# Patient Record
Sex: FEMALE | Race: BLACK OR AFRICAN AMERICAN | NOT HISPANIC OR LATINO | URBAN - METROPOLITAN AREA
[De-identification: names, ages, dates, MRNs, and addresses within clinical notes are randomized per-mention and may not be internally consistent; named-entity substitution may affect disease eponyms.]

---

## 2018-06-11 ENCOUNTER — EMERGENCY (EMERGENCY)
Facility: HOSPITAL | Age: 48
LOS: 1 days | Discharge: HOME | End: 2018-06-11
Attending: EMERGENCY MEDICINE | Admitting: EMERGENCY MEDICINE
Payer: COMMERCIAL

## 2018-06-11 VITALS
HEART RATE: 64 BPM | SYSTOLIC BLOOD PRESSURE: 175 MMHG | OXYGEN SATURATION: 100 % | TEMPERATURE: 98 F | DIASTOLIC BLOOD PRESSURE: 100 MMHG | RESPIRATION RATE: 16 BRPM

## 2018-06-11 VITALS
HEART RATE: 83 BPM | TEMPERATURE: 98 F | OXYGEN SATURATION: 100 % | DIASTOLIC BLOOD PRESSURE: 87 MMHG | RESPIRATION RATE: 16 BRPM | SYSTOLIC BLOOD PRESSURE: 147 MMHG

## 2018-06-11 LAB
ALBUMIN SERPL ELPH-MCNC: 4.6 G/DL — SIGNIFICANT CHANGE UP (ref 3.3–5)
ALP SERPL-CCNC: 80 U/L — SIGNIFICANT CHANGE UP (ref 40–120)
ALT FLD-CCNC: 21 U/L — SIGNIFICANT CHANGE UP (ref 4–33)
AST SERPL-CCNC: 29 U/L — SIGNIFICANT CHANGE UP (ref 4–32)
BASOPHILS # BLD AUTO: 0.08 K/UL — SIGNIFICANT CHANGE UP (ref 0–0.2)
BASOPHILS NFR BLD AUTO: 1.1 % — SIGNIFICANT CHANGE UP (ref 0–2)
BILIRUB SERPL-MCNC: 0.2 MG/DL — SIGNIFICANT CHANGE UP (ref 0.2–1.2)
BUN SERPL-MCNC: 14 MG/DL — SIGNIFICANT CHANGE UP (ref 7–23)
CALCIUM SERPL-MCNC: 9 MG/DL — SIGNIFICANT CHANGE UP (ref 8.4–10.5)
CHLORIDE SERPL-SCNC: 101 MMOL/L — SIGNIFICANT CHANGE UP (ref 98–107)
CO2 SERPL-SCNC: 21 MMOL/L — LOW (ref 22–31)
CREAT SERPL-MCNC: 1.02 MG/DL — SIGNIFICANT CHANGE UP (ref 0.5–1.3)
EOSINOPHIL # BLD AUTO: 0.13 K/UL — SIGNIFICANT CHANGE UP (ref 0–0.5)
EOSINOPHIL NFR BLD AUTO: 1.8 % — SIGNIFICANT CHANGE UP (ref 0–6)
GLUCOSE SERPL-MCNC: 102 MG/DL — HIGH (ref 70–99)
HCT VFR BLD CALC: 39.1 % — SIGNIFICANT CHANGE UP (ref 34.5–45)
HGB BLD-MCNC: 12.7 G/DL — SIGNIFICANT CHANGE UP (ref 11.5–15.5)
IMM GRANULOCYTES # BLD AUTO: 0.01 # — SIGNIFICANT CHANGE UP
IMM GRANULOCYTES NFR BLD AUTO: 0.1 % — SIGNIFICANT CHANGE UP (ref 0–1.5)
LYMPHOCYTES # BLD AUTO: 3.36 K/UL — HIGH (ref 1–3.3)
LYMPHOCYTES # BLD AUTO: 46.4 % — HIGH (ref 13–44)
MCHC RBC-ENTMCNC: 28.9 PG — SIGNIFICANT CHANGE UP (ref 27–34)
MCHC RBC-ENTMCNC: 32.5 % — SIGNIFICANT CHANGE UP (ref 32–36)
MCV RBC AUTO: 89.1 FL — SIGNIFICANT CHANGE UP (ref 80–100)
MONOCYTES # BLD AUTO: 0.53 K/UL — SIGNIFICANT CHANGE UP (ref 0–0.9)
MONOCYTES NFR BLD AUTO: 7.3 % — SIGNIFICANT CHANGE UP (ref 2–14)
NEUTROPHILS # BLD AUTO: 3.13 K/UL — SIGNIFICANT CHANGE UP (ref 1.8–7.4)
NEUTROPHILS NFR BLD AUTO: 43.3 % — SIGNIFICANT CHANGE UP (ref 43–77)
NRBC # FLD: 0 — SIGNIFICANT CHANGE UP
PLATELET # BLD AUTO: 267 K/UL — SIGNIFICANT CHANGE UP (ref 150–400)
PMV BLD: 12.3 FL — SIGNIFICANT CHANGE UP (ref 7–13)
POTASSIUM SERPL-MCNC: 4.6 MMOL/L — SIGNIFICANT CHANGE UP (ref 3.5–5.3)
POTASSIUM SERPL-SCNC: 4.6 MMOL/L — SIGNIFICANT CHANGE UP (ref 3.5–5.3)
PROT SERPL-MCNC: 7.6 G/DL — SIGNIFICANT CHANGE UP (ref 6–8.3)
RBC # BLD: 4.39 M/UL — SIGNIFICANT CHANGE UP (ref 3.8–5.2)
RBC # FLD: 12.7 % — SIGNIFICANT CHANGE UP (ref 10.3–14.5)
SODIUM SERPL-SCNC: 137 MMOL/L — SIGNIFICANT CHANGE UP (ref 135–145)
WBC # BLD: 7.24 K/UL — SIGNIFICANT CHANGE UP (ref 3.8–10.5)
WBC # FLD AUTO: 7.24 K/UL — SIGNIFICANT CHANGE UP (ref 3.8–10.5)

## 2018-06-11 PROCEDURE — 99284 EMERGENCY DEPT VISIT MOD MDM: CPT

## 2018-06-11 RX ORDER — SODIUM CHLORIDE 9 MG/ML
1000 INJECTION INTRAMUSCULAR; INTRAVENOUS; SUBCUTANEOUS ONCE
Qty: 0 | Refills: 0 | Status: COMPLETED | OUTPATIENT
Start: 2018-06-11 | End: 2018-06-11

## 2018-06-11 RX ORDER — METOCLOPRAMIDE HCL 10 MG
10 TABLET ORAL ONCE
Qty: 0 | Refills: 0 | Status: COMPLETED | OUTPATIENT
Start: 2018-06-11 | End: 2018-06-11

## 2018-06-11 RX ORDER — ACETAMINOPHEN 500 MG
1000 TABLET ORAL ONCE
Qty: 0 | Refills: 0 | Status: COMPLETED | OUTPATIENT
Start: 2018-06-11 | End: 2018-06-11

## 2018-06-11 RX ADMIN — SODIUM CHLORIDE 1000 MILLILITER(S): 9 INJECTION INTRAMUSCULAR; INTRAVENOUS; SUBCUTANEOUS at 22:44

## 2018-06-11 RX ADMIN — Medication 400 MILLIGRAM(S): at 23:08

## 2018-06-11 RX ADMIN — Medication 10 MILLIGRAM(S): at 23:08

## 2018-06-11 NOTE — ED ADULT TRIAGE NOTE - CHIEF COMPLAINT QUOTE
Pt comes in for c/o sudden onset of HTN with right side HA that started since Wednesday. Pt reports that she went to urgent care today and was sent here for elevated BP of 197/116. Pt appears in rosemary cute distress, vs as noted, EKG to be completed.

## 2018-06-11 NOTE — ED PROVIDER NOTE - CHPI ED SYMPTOMS POS
right eye pain, right neck pain after shoulder surgery and cleaning attic, right sided headache and hypertension

## 2018-06-11 NOTE — ED PROVIDER NOTE - OBJECTIVE STATEMENT
48F with hx of HLD p/w persistent HA and elevated BP. Patient states HA started a week ago after developing pain in her right eye. pain initially was on the right side, behind her eye, but has since radiated to her entire head. today took ibuprofen for relief at about 8pm, only mild improvement. went to urgent care and noted BP to be very high, referred to ED for eval. No hx of elevated BP in the past. denies any cp, sob, neck pain, weakness. no aggravated by light or noise. no hx of migraines or HAs in the past. (+) intermittent visual changes.

## 2018-06-11 NOTE — ED ADULT NURSE NOTE - PATIENT DISCHARGE SIGNATURE
Bi-Rhombic Flap Text: The defect edges were debeveled with a #15 scalpel blade.  Given the location of the defect and the proximity to free margins a bi-rhombic flap was deemed most appropriate.  Using a sterile surgical marker, an appropriate rhombic flap was drawn incorporating the defect. The area thus outlined was incised deep to adipose tissue with a #15 scalpel blade.  The skin margins were undermined to an appropriate distance in all directions utilizing iris scissors. 12-Jun-2018

## 2018-06-11 NOTE — ED ADULT NURSE NOTE - OBJECTIVE STATEMENT
pt is a&ox3. respirations even and unlabored, in NAD. Pt states R eye pain/ frontal headache radiating to back of head since Wednesday. Also attests to increased stressors. Pt seen by urgent care, sent to ED for elevated BP. pt denies acute vision changes. 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 Pt was evaluated by ED attending. IV placed. Labs drawn and sent to lab. Pt pending UCG at this time, urine cup provided.

## 2018-06-11 NOTE — ED PROVIDER NOTE - PROGRESS NOTE DETAILS
Sign out follow-up: BP improved. Pt feels subjectively better. Labs non-actionable. Supportive care outpt. OMAR.

## 2018-06-11 NOTE — ED PROVIDER NOTE - CARE PLAN
Principal Discharge DX:	Acute non intractable tension-type headache  Secondary Diagnosis:	Hordeolum externum of right upper eyelid  Secondary Diagnosis:	Hypertension, unspecified type

## 2018-06-12 RX ADMIN — Medication 1000 MILLIGRAM(S): at 00:21

## 2021-04-16 NOTE — ED ADULT TRIAGE NOTE - ESI TRIAGE ACUITY LEVEL, MLM
04/16/21                            Freya Roche  [address]  [id number] [facility] [id number]    To Whom It May Concern:    This is to certify Freya Kurtzchrisnilsa was evaluated with SANDRA Hernandez on 04/16/21 and can return to work on 4/17/2021.               SANDRA Hernandez  [facility], [address] [address]  Phone: [phone]  Fax: [phone]    
3

## 2022-10-17 PROBLEM — E78.5 HYPERLIPIDEMIA, UNSPECIFIED: Chronic | Status: ACTIVE | Noted: 2018-06-11

## 2022-10-23 ENCOUNTER — APPOINTMENT (OUTPATIENT)
Dept: ORTHOPEDIC SURGERY | Facility: CLINIC | Age: 52
End: 2022-10-23

## 2022-10-23 VITALS — WEIGHT: 150 LBS | BODY MASS INDEX: 27.6 KG/M2 | HEIGHT: 62 IN

## 2022-10-23 PROCEDURE — 73564 X-RAY EXAM KNEE 4 OR MORE: CPT | Mod: 50

## 2022-10-23 PROCEDURE — 99203 OFFICE O/P NEW LOW 30 MIN: CPT

## 2022-10-23 RX ORDER — NAPROXEN 500 MG/1
500 TABLET ORAL
Qty: 28 | Refills: 1 | Status: COMPLETED | COMMUNITY
Start: 2022-10-23

## 2022-10-23 NOTE — DISCUSSION/SUMMARY
[de-identified] : This is a 52 year old female with bilateral knee chondromalacia and left knee patellar tendonitis. Diagnosis was discussed and treatment options were reviewed. We discussed treatment options in length including oral anti-inflammatories, physical therapy, activity modification, bracing, cortisone injections and viscosupplementation injections. \par \par At this time we will start with conservative therapies. PT was prescribed as well as naproxen. She was advised to use a chopart strap on the left. \par \par The patient was prescribed an anti- inflammatory medication at today's visit. The patient was advised that this medication should be taken with food as they can cause gastrointestinal upset. They patient is also aware that these medications may exacerbate any pre existing hypertension and they should speak with their medical doctor before starting the medication. They were advised to stop the medication if they experience any stomach upset or develop headaches or blurry vision.\par \par She will follow up in 1 month if pain persists, consider MRI. All of her questions were answered. SHe understands and agrees.

## 2022-10-23 NOTE — ASSESSMENT
[FreeTextEntry1] : 4 x-ray Views of the knee were ordered and interpreted by myself. The results were reviewed with the patient and demonstrate normal bony alignment without any fractures or dislocations. There is mild medial joint space narrowing. No lytic or blastic lesions. \par \par

## 2022-10-23 NOTE — HISTORY OF PRESENT ILLNESS
[Gradual] : gradual [7] : 7 [2] : 2 [Sharp] : sharp [Intermittent] : intermittent [Rest] : rest [Walking] : walking [Stairs] : stairs [Full time] : Work status: full time [de-identified] : This is a 53 year old female with "a couple of months" of bilateral knee pain, left worse than right. Pain is mostly anterior and she feels "crunching" in the knees. The left knee did buckle on her a couple of times. She denies swelling or locking. She denies any prior injury or treatments to the knees. She has not tried any OTC medications. \par \par PMH: denies\par Allergies: NKDA [] : no [FreeTextEntry1] : both knee pain  [FreeTextEntry5] : patient presents with bilateral knee pain since 2 weeks ago she went to [facility] urgent care they referred her here to see Dr. yu and get MRI patient would like to see provider before xray if needed  [FreeTextEntry9] : elevated

## 2022-10-23 NOTE — PHYSICAL EXAM
[de-identified] : Patient is well-appearing in no acute distress awake alert and oriented ×3. \par \par Examination of the left knee shows range of motion from 0-135° of flexion. Stable to varus and valgus stress at 0 and 30°. No medial or lateral joint line tenderness. Negative Lachman, negative anterior drawer, negative posterior drawer. No medial or lateral patellar facet tenderness. POSITIVE patellar tendon tenderness. No effusion. No swelling. Skin is intact without rashes or erythema. No warmth about the knee. Negative Clemente's medially and laterally. POSITIVE crepitus is noted. PAIN with patellar compression. \par \par Examination of the right knee shows range of motion from 0-135° of flexion. Stable to varus and valgus stress at 0 and 30°. No medial or lateral joint line tenderness. Negative Lachman, negative anterior drawer, negative posterior drawer. No medial or lateral patellar facet tenderness. No patellar tendon tenderness. No effusion. No swelling. Skin is intact without rashes or erythema. No warmth about the knee. Negative Clemente's medially and laterally. POSITIVE crepitus is noted. \par \par Distally patient is neurovascularly intact with 5 out of 5 strength in dorsiflexion, plantarflexion, great toe extension. Sensation is grossly intact to light touch in the L4-S1 distribution. Palpable dorsalis pedis and posterior tibialis pulses. Toes are warm and well perfused with brisk capillary refill.\par \par

## 2023-03-31 ENCOUNTER — APPOINTMENT (OUTPATIENT)
Dept: ORTHOPEDIC SURGERY | Facility: CLINIC | Age: 53
End: 2023-03-31
Payer: COMMERCIAL

## 2023-03-31 VITALS — HEIGHT: 62 IN | BODY MASS INDEX: 27.6 KG/M2 | WEIGHT: 150 LBS

## 2023-03-31 DIAGNOSIS — Z00.00 ENCOUNTER FOR GENERAL ADULT MEDICAL EXAMINATION W/OUT ABNORMAL FINDINGS: ICD-10-CM

## 2023-03-31 DIAGNOSIS — E78.00 PURE HYPERCHOLESTEROLEMIA, UNSPECIFIED: ICD-10-CM

## 2023-03-31 PROCEDURE — 99213 OFFICE O/P EST LOW 20 MIN: CPT

## 2023-03-31 PROCEDURE — 73030 X-RAY EXAM OF SHOULDER: CPT | Mod: LT

## 2023-03-31 RX ORDER — ATORVASTATIN CALCIUM 80 MG/1
TABLET, FILM COATED ORAL
Refills: 0 | Status: ACTIVE | COMMUNITY

## 2023-03-31 RX ORDER — IBUPROFEN 600 MG/1
600 TABLET, FILM COATED ORAL TWICE DAILY
Qty: 60 | Refills: 2 | Status: ACTIVE | COMMUNITY
Start: 2023-03-31 | End: 1900-01-01

## 2023-03-31 RX ORDER — NAPROXEN 500 MG/1
500 TABLET ORAL
Qty: 28 | Refills: 1 | Status: COMPLETED | COMMUNITY
Start: 2022-10-25 | End: 2023-03-31

## 2023-03-31 NOTE — HISTORY OF PRESENT ILLNESS
[10] : 10 [0] : 0 [Radiating] : radiating [Sharp] : sharp [Shooting] : shooting [Nothing helps with pain getting better] : Nothing helps with pain getting better [de-identified] : 3/31/23: 52 y/o RHD female c/o left shoulder pain for the past month. No specific injury. Describes pain with ROM. Denies radiating pain. No history of prior injury. Also, notes persistent pain in the left knee. She went to [facility], where PT was prescribed with good relief. [FreeTextEntry1] : lt shoulder lt knee [FreeTextEntry5] : Pt is a 53 year old F who presents pain for lt shoulder. Pain started February 2023, pain increase. Pain worsen when moving lt shoulder. Neck is stiff. Sleep is affected. Pain radiates from above the elbow up the lt shoulder. No previous injury. \par \par Started PT for lt knee, states she is improving and pain decreasing.

## 2023-03-31 NOTE — PHYSICAL EXAM
[5 ___] : forward flexion 5[unfilled]/5 [5___] : hamstring 5[unfilled]/5 [4___] : abduction 4[unfilled]/5 [Left] : left shoulder [There are no fractures, subluxations or dislocations. No significant abnormalities are seen] : There are no fractures, subluxations or dislocations. No significant abnormalities are seen [TWNoteComboBox7] : active forward flexion 170 degrees [TWNoteComboBox6] : internal rotation lateral hip [de-identified] : external rotation 30 degrees [] : no erythema

## 2023-03-31 NOTE — ASSESSMENT
[FreeTextEntry1] : subacute onset left shoulder pain.  likely bursitis versus ptrct.  \par \par history of right shoulder surgery by me.  doing well.  soft tissue tenodesis, bursectomy, sad in 2018\par \par left knee pain with chondromalacia improved with Pt.\par \par in design, works a lot with mannequins.

## 2023-06-09 ENCOUNTER — APPOINTMENT (OUTPATIENT)
Dept: ORTHOPEDIC SURGERY | Facility: CLINIC | Age: 53
End: 2023-06-09
Payer: COMMERCIAL

## 2023-06-09 VITALS — BODY MASS INDEX: 27.6 KG/M2 | HEIGHT: 62 IN | WEIGHT: 150 LBS

## 2023-06-09 PROCEDURE — 99214 OFFICE O/P EST MOD 30 MIN: CPT

## 2023-06-09 PROCEDURE — 73610 X-RAY EXAM OF ANKLE: CPT | Mod: LT

## 2023-06-09 NOTE — DISCUSSION/SUMMARY
[de-identified] : Progress Note completed by Kristie Rowell PA-C\par * Dr. Maciel -- The documentation recorded in this note accurately reflects the decisions made by me during this visit.

## 2023-06-09 NOTE — HISTORY OF PRESENT ILLNESS
[0] : 0 [Localized] : localized [de-identified] : 3/31/23: 52 y/o RHD female c/o left shoulder pain for the past month. No specific injury. Describes pain with ROM. Denies radiating pain. No history of prior injury. Also, notes persistent pain in the left knee. She went to [facility], where PT was prescribed with good relief.\par 6/9/23:  left knee and left shoulder improved with pt.  no pain.  now left ankle pain since end of may 2023.  no injury.  possible exaerbation due to walking in the city. [FreeTextEntry1] : left knee, left shoulder  [de-identified] : none

## 2023-06-09 NOTE — ASSESSMENT
[FreeTextEntry1] : subacute onset left shoulder pain.  likely bursitis versus ptrct.   improved with pt. \par \par history of right shoulder surgery by me.  doing well.  soft tissue tenodesis, bursectomy, sad in 2018.  minimal pain.\par \par left knee pain with chondromalacia improved with Pt.\par \par now left ankle pain since end of may 2023.  no injury.  possible exacerbation due to walking in the city.  medial pain along deltoid and post tib.  possible tendonitis versus sprain\par \par in design, works a lot with mannequins.

## 2023-06-09 NOTE — PHYSICAL EXAM
[5 ___] : forward flexion 5[unfilled]/5 [4___] : abduction 4[unfilled]/5 [NL (40)] : plantar flexion 40 degrees [NL 30)] : inversion 30 degrees [NL (20)] : eversion 20 degrees [5___] : eversion 5[unfilled]/5 [2+] : posterior tibialis pulse: 2+ [TWNoteComboBox7] : active forward flexion 170 degrees [TWNoteComboBox6] : internal rotation lateral hip [de-identified] : external rotation 30 degrees [] : no erythema [Left] : left ankle [There are no fractures, subluxations or dislocations. No significant abnormalities are seen] : There are no fractures, subluxations or dislocations. No significant abnormalities are seen

## 2023-08-11 ENCOUNTER — APPOINTMENT (OUTPATIENT)
Dept: ORTHOPEDIC SURGERY | Facility: CLINIC | Age: 53
End: 2023-08-11
Payer: COMMERCIAL

## 2023-08-11 DIAGNOSIS — M22.41 CHONDROMALACIA PATELLAE, RIGHT KNEE: ICD-10-CM

## 2023-08-11 DIAGNOSIS — M23.92 UNSPECIFIED INTERNAL DERANGEMENT OF LEFT KNEE: ICD-10-CM

## 2023-08-11 DIAGNOSIS — M22.42 CHONDROMALACIA PATELLAE, LEFT KNEE: ICD-10-CM

## 2023-08-11 DIAGNOSIS — M75.52 BURSITIS OF LEFT SHOULDER: ICD-10-CM

## 2023-08-11 PROCEDURE — 99213 OFFICE O/P EST LOW 20 MIN: CPT

## 2023-08-11 NOTE — ASSESSMENT
[FreeTextEntry1] : subacute onset left shoulder pain.  likely bursitis versus ptrct.   improved with pt.   history of right shoulder surgery by me.  doing well.  soft tissue tenodesis, bursectomy, sad in 2018.  minimal pain.  left knee pain with chondromalacia.  continued pain.  medial and anterior.  possible mmt, possible pf chondral damage.   now left ankle pain since end of may 2023.  no injury.  possible exacerbation due to walking in the city.  medial pain along deltoid and post tib.  possible tendonitis versus sprain versus PTT tear  in design, works a lot with mannequins.

## 2023-08-11 NOTE — REASON FOR VISIT
[FreeTextEntry2] : Follow Up: Lt Knee still in pain. ankle is swollen. would like to get an MRI done.

## 2023-08-11 NOTE — PHYSICAL EXAM
[5 ___] : forward flexion 5[unfilled]/5 [4___] : abduction 4[unfilled]/5 [NL (40)] : plantar flexion 40 degrees [NL 30)] : inversion 30 degrees [NL (20)] : eversion 20 degrees [5___] : eversion 5[unfilled]/5 [2+] : posterior tibialis pulse: 2+ [Left] : left ankle [There are no fractures, subluxations or dislocations. No significant abnormalities are seen] : There are no fractures, subluxations or dislocations. No significant abnormalities are seen [TWNoteComboBox7] : active forward flexion 170 degrees [TWNoteComboBox6] : internal rotation lateral hip [de-identified] : external rotation 30 degrees [] : no erythema

## 2023-08-11 NOTE — HISTORY OF PRESENT ILLNESS
[6] : 6 [de-identified] : 3/31/23: 54 y/o RHD female c/o left shoulder pain for the past month. No specific injury. Describes pain with ROM. Denies radiating pain. No history of prior injury. Also, notes persistent pain in the left knee. She went to [facility], where PT was prescribed with good relief. 6/9/23:  left knee and left shoulder improved with pt.  no pain.  now left ankle pain since end of may 2023.  no injury.  possible exaerbation due to walking in the city. 8/11/23:  left knee and left ankle pain continues.

## 2023-08-11 NOTE — DISCUSSION/SUMMARY
[de-identified] : mri left ankle to assess.  mri left knee to assess.  pt. follow up with resdults.   Progress note completed by Kristie Rowell PA-C *Dr. Maciel - The WENDY assigned on this date is under my supervision and saw this patient independently.  I have reviewed the note/plan and agree with the treatment provided.

## 2023-08-19 ENCOUNTER — APPOINTMENT (OUTPATIENT)
Dept: MRI IMAGING | Facility: CLINIC | Age: 53
End: 2023-08-19
Payer: COMMERCIAL

## 2023-08-19 PROCEDURE — 73721 MRI JNT OF LWR EXTRE W/O DYE: CPT | Mod: NC

## 2023-08-25 ENCOUNTER — APPOINTMENT (OUTPATIENT)
Dept: ORTHOPEDIC SURGERY | Facility: CLINIC | Age: 53
End: 2023-08-25
Payer: COMMERCIAL

## 2023-08-25 VITALS — BODY MASS INDEX: 27.6 KG/M2 | WEIGHT: 150 LBS | HEIGHT: 62 IN

## 2023-08-25 DIAGNOSIS — M76.52 PATELLAR TENDINITIS, LEFT KNEE: ICD-10-CM

## 2023-08-25 DIAGNOSIS — M77.52 OTHER ENTHESOPATHY OF LT FOOT AND ANKLE: ICD-10-CM

## 2023-08-25 PROCEDURE — 99214 OFFICE O/P EST MOD 30 MIN: CPT

## 2023-08-25 RX ORDER — DICLOFENAC SODIUM 75 MG/1
75 TABLET, DELAYED RELEASE ORAL
Qty: 60 | Refills: 2 | Status: ACTIVE | COMMUNITY
Start: 2023-08-25 | End: 1900-01-01

## 2023-08-25 NOTE — PHYSICAL EXAM
[5 ___] : forward flexion 5[unfilled]/5 [4___] : abduction 4[unfilled]/5 [NL (40)] : plantar flexion 40 degrees [NL 30)] : inversion 30 degrees [NL (20)] : eversion 20 degrees [5___] : eversion 5[unfilled]/5 [2+] : posterior tibialis pulse: 2+ [Left] : left ankle [There are no fractures, subluxations or dislocations. No significant abnormalities are seen] : There are no fractures, subluxations or dislocations. No significant abnormalities are seen [TWNoteComboBox7] : active forward flexion 170 degrees [TWNoteComboBox6] : internal rotation lateral hip [de-identified] : external rotation 30 degrees [] : no ecchymosis

## 2023-08-25 NOTE — DISCUSSION/SUMMARY
[de-identified] : nsaids. hep and PT. orthotic. follow up 4 - 6 weeks. will consider foot and consult

## 2023-08-25 NOTE — ASSESSMENT
[FreeTextEntry1] : subacute onset left shoulder pain.  likely bursitis versus ptrct.   improved with pt.   history of right shoulder surgery by me.  doing well.  soft tissue tenodesis, bursectomy, sad in 2018.  minimal pain.  left knee pain with chondromalacia.  mri 2023 shows meniscocapsular small tear, mild oa.  improved.  now left ankle pain since end of may 2023.  no injury.  mri 2023 shows PTT inflammation, peroneal inflammationr  in design, works a lot with mannequins.

## 2023-08-25 NOTE — HISTORY OF PRESENT ILLNESS
[6] : 6 [4] : 4 [Dull/Aching] : dull/aching [Localized] : localized [Throbbing] : throbbing [de-identified] : 3/31/23: 54 y/o RHD female c/o left shoulder pain for the past month. No specific injury. Describes pain with ROM. Denies radiating pain. No history of prior injury. Also, notes persistent pain in the left knee. She went to [facility], where PT was prescribed with good relief. 6/9/23:  left knee and left shoulder improved with pt.  no pain.  now left ankle pain since end of may 2023.  no injury.  possible exaerbation due to walking in the city. 8/11/23:  left knee and left ankle pain continues. 8/25/23:  follow up left ankle and knee [] : no [FreeTextEntry1] : left knee, left ankle  [de-identified] : physical therapy

## 2023-08-25 NOTE — DATA REVIEWED
[Knee] : knee [MRI] : MRI [Left] : left [Ankle] : ankle [Report was reviewed and noted in the chart] : The report was reviewed and noted in the chart [I reviewed the films/CD] : I reviewed the films/CD

## 2023-10-06 ENCOUNTER — APPOINTMENT (OUTPATIENT)
Dept: ORTHOPEDIC SURGERY | Facility: CLINIC | Age: 53
End: 2023-10-06